# Patient Record
Sex: MALE | Race: WHITE
[De-identification: names, ages, dates, MRNs, and addresses within clinical notes are randomized per-mention and may not be internally consistent; named-entity substitution may affect disease eponyms.]

---

## 2017-02-18 ENCOUNTER — HOSPITAL ENCOUNTER (INPATIENT)
Dept: HOSPITAL 74 - YASAS | Age: 42
LOS: 3 days | Discharge: LEFT BEFORE DISCHARGE | DRG: 770 | End: 2017-02-21
Attending: INTERNAL MEDICINE | Admitting: INTERNAL MEDICINE
Payer: COMMERCIAL

## 2017-02-18 VITALS — BODY MASS INDEX: 27 KG/M2

## 2017-02-18 DIAGNOSIS — F17.210: ICD-10-CM

## 2017-02-18 DIAGNOSIS — E87.6: ICD-10-CM

## 2017-02-18 DIAGNOSIS — F10.230: ICD-10-CM

## 2017-02-18 DIAGNOSIS — F19.24: ICD-10-CM

## 2017-02-18 DIAGNOSIS — Z59.0: ICD-10-CM

## 2017-02-18 DIAGNOSIS — Z86.11: ICD-10-CM

## 2017-02-18 DIAGNOSIS — F11.23: Primary | ICD-10-CM

## 2017-02-18 PROCEDURE — HZ2ZZZZ DETOXIFICATION SERVICES FOR SUBSTANCE ABUSE TREATMENT: ICD-10-PCS | Performed by: INTERNAL MEDICINE

## 2017-02-18 RX ADMIN — Medication SCH MG: at 23:14

## 2017-02-18 SDOH — ECONOMIC STABILITY - HOUSING INSECURITY: HOMELESSNESS: Z59.0

## 2017-02-18 NOTE — HP
COWS





- Scale


Resting Pulse: 0= NC 80 or Below


Sweatin=Flushed/Facial Moisture


Restless Observation: 3= Extraneous Movement


Pupil Size: 1= Pupils >than Normal


Bone or Joint Aches: 1= Mild Discomfort


Runny Nose/ Eye Tearin= Runny Nose/Eyes


GI Upset > 30mins: 1= Stomach Cramp


Tremor Observation: 1= Tremor Felt, Not Seen


Yawning Observation: 1= 1-2x During Session


Anxiety or Irritability: 2=Irritable/Anxious


Goose Flesh Skin: 0=Smooth Skin


COWS Score: 14





CIWA Score





- CIWA Score


Nausea/Vomitin-Mild Nausea/No Vomiting


Muscle Tremors: 3


Anxiety: 3


Agitation: 3


Paroxysmal Sweats: 1-Minimal Palms Moist


Orientation: 1-Uncertain about Date


Tacttile Disturbances: 0-None


Auditory Disturbances: 2-Mild Harshness/Frighten


Visual Disturbances: 2-Mild Sensitivity


Headache: 0-None Present


CIWA-Ar Total Score: 16





Admission ROS [facility]





- HPI


Chief Complaint: 


WITHDRAWAL SYMPTOMS 





Allergies/Adverse Reactions: 


 Allergies











Allergy/AdvReac Type Severity Reaction Status Date / Time


 


Seafood Allergy Severe Swelling Uncoded 17 20:40











History of Present Illness: 


41 Y.O. MAN WITH AN EXTENSIVE HISTORY OF DRUG AND ALCOHOL DEPENDENCE IS SEEKING 

DETOX.  THE HAS COMPLETED DETOX AND REHAB PREVIOUSLY AT ANOTHER FACILITY.  HE 

STATES HE HAS HAD A PERIOD OF 4 YEARS OF SOBRIETY. 


Exam Limitations: No Limitations





- Ebola screening


Have you traveled outside of the country in the last 21 days: No (N)


Have you had contact with anyone from an Ebola affected area: No


Have you been sick,other than usual withdrawal symptoms: No


Do you have a fever: No





- Review of Systems


Constitutional: Loss of Appetite, Changes in sleep, Unintentional Wgt. Loss


EENT: reports: Blurred Vision, Double Vision, Tearing


Respiratory: reports: No Symptoms reported


Cardiac: reports: No Symptoms Reported


GI: reports: Poor Appetite


: reports: No Symptoms Reported


Musculoskeletal: reports: No Symptoms Reported


Integumentary: reports: No Symptoms Reported


Neuro: reports: No Symptoms reported, Tremors


Endocrine: reports: No Symptoms Reported


Hematology: reports: No Symptoms Reported


Psychiatric: reports: Anxious


Other Systems: Reviewed and Negative





Patient History





- Patient Medical History


Hx Anemia: No


Hx Asthma: No


Hx Chronic Obstructive Pulmonary Disease (COPD): No


Hx Cancer: No


Hx Cardiac Disorders: No


Hx Congestive Heart Failure: No


Hx Hypertension: No


Hx Hypercholesterolemia: No


Hx Pacemaker: No


HX Cerebrovascular Accident: No


Hx Seizures: No


Hx Dementia: No


Hx Diabetes: No


Hx Gastrointestinal Disorders: No


Hx Liver Disease: No


Hx Genitourinary Disorders: No


Hx Sexually Transmitted Disorders: No


Hx Renal Disease (ESRD): No


Hx Thyroid Disease: No


Hx Human Immunodeficiency Virus (HIV): No


Hx Hepatitis C: No


Hx Depression: No


Hx Suicide Attempt: No


Hx Bipolar Disorder: No


Hx Schizophrenia: No





- Patient Surgical History


Past Surgical History: No





- PPD History


Previous Implant?: No


Results: NEEDS CXR 


PPD to be Administered?: No





- Reproductive History


Patient is a Female of Child Bearing Age (11 -55 yrs old): No





- Smoking Cessation


Smoking history: Current every day smoker


Aproximately how many cigarettes per day: 10


Hx Chewing Tobacco Use: No


Initiated information on smoking cessation: Yes


'Breaking Loose' booklet given: 17





- Substance & Tx. History


Hx Alcohol Use: Yes


Hx Substance Use: Yes


Substance Use Type: Alcohol, Heroin


Hx Substance Use Treatment: Yes (DETOX AND REHAB )





- Substances Abused


  ** Alcohol


Route: Oral


Frequency: Daily


Amount used: beer- 2 six pack


Age of first use: 17


Date of Last Use: 17





  ** Heroin


Route: Injection


Frequency: Daily


Amount used: 15 bags


Age of first use: 24


Date of Last Use: 17





Family Disease History





- Family Disease History


Family History: Denies





Admission Physical Exam [facility]





- Vital Signs


Vital Signs: 


 Vital Signs - 24 hr











  17





  20:27


 


Temperature 97.3 F L


 


Pulse Rate 71


 


Respiratory 18





Rate 


 


Blood Pressure 111/74














- Physical


General Appearance: Yes: No Apparent Distress, Tremorous, Irritable, Anxious


HEENTM: Yes: Nasal Congestion


Respiratory: Yes: Chest Non-Tender, Lungs Clear, Normal Breath Sounds, No 

Respiratory Distress, No Accessory Muscle Use


Neck: Yes: No masses,lesions,Nodules, Trachea in good position


Breast: Yes: Breast Exam Deferred


Cardiology: Yes: Regular Rhythm, Regular Rate, S1, S2


Abdominal: Yes: Normal Bowel Sounds, Non Tender, Flat, Soft


Genitourinary: Yes: Within Normal Limits


Back: Yes: Normal Inspection


Extremities: Yes: Normal Capillary Refill, Normal Inspection, Normal Range of 

Motion, Non-Tender, Tremors


Neurological: Yes: Alert, Normal Mood/Affect, Normal Response


Integumentary: Yes: Normal Color, Dry, Warm, Track Marks


Lymphatic: Yes: Within Normal Limits





- Diagnostic


(1) Alcohol dependence with uncomplicated withdrawal


Current Visit: Yes   Status: Chronic





(2) Opioid dependence with withdrawal


Current Visit: Yes   Status: Chronic





(3) History of treatment for tuberculosis


Current Visit: Yes   Status: Chronic








Cleared for Admission [facility]





- Detox or Rehab


[facility] Level of Care: Medically Managed


Detox Regimen/Protocol: Methadone/Librium





[facility] Breath Alcohol Content


Breath Alcohol Content: 0





Urine Drug Screen





- Results


Drug Screen Negative: No


Urine Drug Screen Results: DANGELO-Cocaine, OPI-Opiates, MTD-Methadone, OXY-

Oxycodone

## 2017-02-19 LAB
ALBUMIN SERPL-MCNC: 3.4 G/DL (ref 3.4–5)
ALP SERPL-CCNC: 82 U/L (ref 45–117)
ALT SERPL-CCNC: 18 U/L (ref 12–78)
ANION GAP SERPL CALC-SCNC: 11 MMOL/L (ref 8–16)
AST SERPL-CCNC: 11 U/L (ref 15–37)
BILIRUB SERPL-MCNC: 0.4 MG/DL (ref 0.2–1)
CALCIUM SERPL-MCNC: 8.4 MG/DL (ref 8.5–10.1)
CO2 SERPL-SCNC: 27 MMOL/L (ref 21–32)
CREAT SERPL-MCNC: 1 MG/DL (ref 0.7–1.3)
DEPRECATED RDW RBC AUTO: 13.2 % (ref 11.9–15.9)
GLUCOSE SERPL-MCNC: 127 MG/DL (ref 74–106)
MCH RBC QN AUTO: 29.5 PG (ref 25.7–33.7)
MCHC RBC AUTO-ENTMCNC: 33.9 G/DL (ref 32–35.9)
MCV RBC: 87 FL (ref 80–96)
PLATELET # BLD AUTO: 244 K/MM3 (ref 134–434)
PMV BLD: 7.8 FL (ref 7.5–11.1)
PROT SERPL-MCNC: 6.1 G/DL (ref 6.4–8.2)
WBC # BLD AUTO: 5 K/MM3 (ref 4–10)

## 2017-02-19 RX ADMIN — Medication SCH TAB: at 10:25

## 2017-02-19 RX ADMIN — Medication SCH MG: at 22:41

## 2017-02-19 RX ADMIN — POTASSIUM CHLORIDE SCH MEQ: 1500 TABLET, EXTENDED RELEASE ORAL at 22:41

## 2017-02-19 RX ADMIN — NICOTINE SCH MG: 14 PATCH, EXTENDED RELEASE TRANSDERMAL at 10:26

## 2017-02-19 RX ADMIN — POTASSIUM CHLORIDE SCH: 1500 TABLET, EXTENDED RELEASE ORAL at 13:42

## 2017-02-20 LAB
APPEARANCE UR: CLEAR
BILIRUB UR STRIP.AUTO-MCNC: NEGATIVE MG/DL
COLOR UR: YELLOW
KETONES UR QL STRIP: NEGATIVE
LEUKOCYTE ESTERASE UR QL STRIP.AUTO: NEGATIVE
NITRITE UR QL STRIP: NEGATIVE
PH UR: 5 [PH] (ref 5–8)
PROT UR QL STRIP: NEGATIVE
PROT UR QL STRIP: NEGATIVE
RBC # UR STRIP: NEGATIVE /UL
SP GR UR: 1.02 (ref 1–1.03)
UROBILINOGEN UR STRIP-MCNC: NEGATIVE E.U./DL (ref 0.2–1)

## 2017-02-20 RX ADMIN — METHADONE HYDROCHLORIDE SCH MG: 5 TABLET ORAL at 10:11

## 2017-02-20 RX ADMIN — Medication SCH TAB: at 10:11

## 2017-02-20 RX ADMIN — POTASSIUM CHLORIDE SCH MEQ: 1500 TABLET, EXTENDED RELEASE ORAL at 10:10

## 2017-02-20 RX ADMIN — Medication SCH MG: at 22:14

## 2017-02-20 RX ADMIN — POTASSIUM CHLORIDE SCH MEQ: 1500 TABLET, EXTENDED RELEASE ORAL at 22:14

## 2017-02-20 RX ADMIN — NICOTINE SCH MG: 14 PATCH, EXTENDED RELEASE TRANSDERMAL at 10:11

## 2017-02-20 NOTE — CONSULT
[facility] Psychiatric Consult





- Data


Date of interview: 02/20/17


Admission source: [facility]


Identifying data: Mr Lewis is a 41 years old single  male, father of 

a 23 years old son, unemployed, homeless seeking detox treatment for alcohol 

and heroin


Substance Abuse History: - Smoking Cessation.  Smoking history: Current every 

day smoker.  Aproximately how many cigarettes per day: 10.  Hx Chewing Tobacco 

Use: No.  Initiated information on smoking cessation: Yes.  'Breaking Loose' 

booklet given: 02/18/17.  - Substance & Tx. History.  Hx Alcohol Use: Yes.  Hx 

Substance Use: Yes.  Substance Use Type: Alcohol, Heroin.  Hx Substance Use 

Treatment: Yes (DETOX AND REHAB ).  - Substances Abused.  ** Alcohol.  Route: 

Oral.  Frequency: Daily.  Amount used: beer- 2 six pack.  Age of first use: 17.

  Date of Last Use: 02/16/17.  ** Heroin.  Route: Injection.  Frequency: Daily.

  Amount used: 15 bags.  Age of first use: 24.  Date of Last Use: 02/17/17


Medical History: Unremarkable except prophylactic treatment for TB.  Smokes 10 

cigarettes daily


Psychiatric History: Denies previous psychiatric contact. However, reports 

feeling depressed





Mental Status Exam





- Mental Status Exam


Alert and Oriented to: Time, Place, Person


Cognitive Function: Fair


Patient Appearance: Well Groomed


Mood: Depressed


Affect: Blunted


Patient Behavior: Cooperative


Speech Pattern: Clear


Voice Loudness: Normal


Thought Process: Intact


Thought Disorder: Not Present


Hallucinations: Denies


Suicidal Ideation: Denies


Homicidal Ideation: Denies


Insight/Judgement: Poor


Sleep: Fair


Appetite: Good


Muscle strength/Tone: Normal


Gait/Station: Normal





Psychiatric Findings





- Problem List (Axis 1, 2,3)


(1) Substance induced mood disorder


Current Visit: Yes   Status: Acute





(2) Alcohol dependence with uncomplicated withdrawal


Current Visit: Yes   Status: Chronic





(3) Opioid dependence with withdrawal


Current Visit: Yes   Status: Chronic





(4) Nicotine dependence


Current Visit: Yes   Status: Acute   








- Initial Treatment Plan


Initial Treatment Plan: Continue inpatient detoxification

## 2017-02-20 NOTE — EKG
Test Reason : 

Blood Pressure : ***/*** mmHG

Vent. Rate : 071 BPM     Atrial Rate : 071 BPM

   P-R Int : 158 ms          QRS Dur : 112 ms

    QT Int : 388 ms       P-R-T Axes : 062 019 035 degrees

   QTc Int : 421 ms

 

NORMAL SINUS RHYTHM

NORMAL ECG

NO PREVIOUS ECGS AVAILABLE

Confirmed by KATE PORTER MD ([id number]) on 2/20/2017 12:47:11 AM

 

Referred By:             Confirmed By:KATE PORTER MD

## 2017-02-20 NOTE — PN
S CIWA





- CIWA Score


Nausea/Vomitin


Muscle Tremors: 3


Anxiety: 3


Agitation: 2


Paroxysmal Sweats: 3


Orientation: 0-Oriented


Tacttile Disturbances: 2-Mild Itch/Numbness/Burn


Auditory Disturbances: 1-Very Mild


Visual Disturbances: 2-Mild Sensitivity


Headache: 0-None Present


CIWA-Ar Total Score: 18





[facility] COWS





- Scale


Resting Pulse: 0= [place] 80 or Below


Sweatin= Chills/Flushing


Restless Observation: 1= Difficult to Sit Still


Pupil Size: 0= Normal to Room Light


Bone or Joint Aches: 2= Severe Diffuse Aches


Runny Nose/ Eye Tearin= Nasal Congestion


GI Upset > 30mins: 2= Nausea/Diarrhea


Tremor Observation of Outstretched Hands: 2= Slight Tremor Visible


Yawning Observation: 1= 1-2x During Session


Anxiety or Irritability: 1=Feels Anxious/Irritable


Goose Flesh Skin: 3=Piloerection


COWS Score: 14





B[facility] Progress Note (SOAP)


Subjective: 


Tremors, Lower Back Ache, Nausea, Sweating, Anxiety.


Objective: 


PT. A & O X 3, OBSERVED AMBULATING ON UNIT.





17 16:24


 Vital Signs











Temperature  98.2 F   17 12:59


 


Pulse Rate  80   17 12:59


 


Respiratory Rate  18   17 12:59


 


Blood Pressure  125/81   17 12:59


 


O2 Sat by Pulse Oximetry (%)      








 Laboratory Last Values











WBC  5.0 K/mm3 (4.0-10.0)   17  07:45    


 


RBC  4.29 M/mm3 (4.00-5.60)   17  07:45    


 


Hgb  12.6 GM/dL (11.7-16.9)   17  07:45    


 


Hct  37.3 % (35.4-49)   17  07:45    


 


MCV  87.0 fl (80-96)   17  07:45    


 


MCHC  33.9 g/dl (32.0-35.9)   17  07:45    


 


RDW  13.2 % (11.9-15.9)   17  07:45    


 


Plt Count  244 K/MM3 (134-434)   17  07:45    


 


MPV  7.8 fl (7.5-11.1)   17  07:45    


 


Sodium  142 mmol/L (136-145)   17  07:45    


 


Potassium  3.4 mmol/L (3.5-5.1)  L  17  07:45    


 


Chloride  104 mmol/L ()   17  07:45    


 


Carbon Dioxide  27 mmol/L (21-32)   17  07:45    


 


Anion Gap  11  (8-16)   17  07:45    


 


BUN  12 mg/dL (7-18)   17  07:45    


 


Creatinine  1.0 mg/dL (0.7-1.3)   17  07:45    


 


Creat Clearance w eGFR  > 60  (>60)   17  07:45    


 


Random Glucose  127 mg/dL ()  H  17  07:45    


 


Calcium  8.4 mg/dL (8.5-10.1)  L  17  07:45    


 


Total Bilirubin  0.4 mg/dL (0.2-1.0)   17  07:45    


 


AST  11 U/L (15-37)  L  17  07:45    


 


ALT  18 U/L (12-78)   17  07:45    


 


Alkaline Phosphatase  82 U/L ()   17  07:45    


 


Total Protein  6.1 g/dl (6.4-8.2)  L  17  07:45    


 


Albumin  3.4 g/dl (3.4-5.0)   17  07:45    


 


Urine Color  Yellow   17  07:00    


 


Urine Appearance  Clear   17  07:00    


 


Urine pH  5.0  (5.0-8.0)   17  07:00    


 


Ur Specific Gravity  1.019  (1.001-1.035)   17  07:00    


 


Urine Protein  Negative  (NEGATIVE)   17  07:00    


 


Urine Glucose (UA)  Negative  (NEGATIVE)   17  07:00    


 


Urine Ketones  Negative  (NEGATIVE)   17  07:00    


 


Urine Blood  Negative  (NEGATIVE)   17  07:00    


 


Urine Nitrite  Negative  (NEGATIVE)   17  07:00    


 


Urine Bilirubin  Negative  (NEGATIVE)   17  07:00    


 


Urine Urobilinogen  Negative E.U./dl (0.2-1.0)   17  07:00    


 


Ur Leukocyte Esterase  Negative  (NEGATIVE)   17  07:00    


 


RPR Titer  Nonreactive  (NONREACTIVE)   17  07:45    








LABS NOTED.





17 16:26





Assessment: 


17 16:25


WITHDRAWAL SYMPTOMS.


HYPOKALEMIA.





Plan: 


CONTINUE DETOX.

## 2017-02-21 VITALS — SYSTOLIC BLOOD PRESSURE: 111 MMHG | TEMPERATURE: 97.7 F | DIASTOLIC BLOOD PRESSURE: 78 MMHG | HEART RATE: 77 BPM

## 2017-02-21 RX ADMIN — METHADONE HYDROCHLORIDE SCH MG: 5 TABLET ORAL at 10:09

## 2017-02-21 RX ADMIN — Medication SCH TAB: at 10:09

## 2017-02-21 RX ADMIN — POTASSIUM CHLORIDE SCH MEQ: 1500 TABLET, EXTENDED RELEASE ORAL at 10:09

## 2017-02-21 RX ADMIN — NICOTINE SCH MG: 14 PATCH, EXTENDED RELEASE TRANSDERMAL at 10:09

## 2017-02-21 NOTE — DS
[facility] Detox Discharge Summary


Admission Date: 


02/18/17





Discharge Date: 02/21/17





- History


Present History: Alcohol Dependence, Opioid Dependence


Additional Comments: 


PT DECLINED TO CONTINUE WITH DETOX  STATING  PERSONAL REASONS. 





Pertinent Past History: 


DEPRESSION HX





- Physical Exam Results


Vital Signs: 


 Vital Signs











Temperature  97.7 F   02/21/17 11:06


 


Pulse Rate  77   02/21/17 11:06


 


Respiratory Rate  19   02/21/17 11:06


 


Blood Pressure  111/78   02/21/17 11:06


 


O2 Sat by Pulse Oximetry (%)      











Pertinent Admission Physical Exam Findings: 


WITHDRAWAL SX


 Laboratory Last Values











WBC  5.0 K/mm3 (4.0-10.0)   02/19/17  07:45    


 


RBC  4.29 M/mm3 (4.00-5.60)   02/19/17  07:45    


 


Hgb  12.6 GM/dL (11.7-16.9)   02/19/17  07:45    


 


Hct  37.3 % (35.4-49)   02/19/17  07:45    


 


MCV  87.0 fl (80-96)   02/19/17  07:45    


 


MCHC  33.9 g/dl (32.0-35.9)   02/19/17  07:45    


 


RDW  13.2 % (11.9-15.9)   02/19/17  07:45    


 


Plt Count  244 K/MM3 (134-434)   02/19/17  07:45    


 


MPV  7.8 fl (7.5-11.1)   02/19/17  07:45    


 


Sodium  142 mmol/L (136-145)   02/19/17  07:45    


 


Potassium  3.4 mmol/L (3.5-5.1)  L  02/19/17  07:45    


 


Chloride  104 mmol/L ()   02/19/17  07:45    


 


Carbon Dioxide  27 mmol/L (21-32)   02/19/17  07:45    


 


Anion Gap  11  (8-16)   02/19/17  07:45    


 


BUN  12 mg/dL (7-18)   02/19/17  07:45    


 


Creatinine  1.0 mg/dL (0.7-1.3)   02/19/17  07:45    


 


Creat Clearance w eGFR  > 60  (>60)   02/19/17  07:45    


 


Random Glucose  127 mg/dL ()  H  02/19/17  07:45    


 


Calcium  8.4 mg/dL (8.5-10.1)  L  02/19/17  07:45    


 


Total Bilirubin  0.4 mg/dL (0.2-1.0)   02/19/17  07:45    


 


AST  11 U/L (15-37)  L  02/19/17  07:45    


 


ALT  18 U/L (12-78)   02/19/17  07:45    


 


Alkaline Phosphatase  82 U/L ()   02/19/17  07:45    


 


Total Protein  6.1 g/dl (6.4-8.2)  L  02/19/17  07:45    


 


Albumin  3.4 g/dl (3.4-5.0)   02/19/17  07:45    


 


Urine Color  Yellow   02/20/17  07:00    


 


Urine Appearance  Clear   02/20/17  07:00    


 


Urine pH  5.0  (5.0-8.0)   02/20/17  07:00    


 


Ur Specific Gravity  1.019  (1.001-1.035)   02/20/17  07:00    


 


Urine Protein  Negative  (NEGATIVE)   02/20/17  07:00    


 


Urine Glucose (UA)  Negative  (NEGATIVE)   02/20/17  07:00    


 


Urine Ketones  Negative  (NEGATIVE)   02/20/17  07:00    


 


Urine Blood  Negative  (NEGATIVE)   02/20/17  07:00    


 


Urine Nitrite  Negative  (NEGATIVE)   02/20/17  07:00    


 


Urine Bilirubin  Negative  (NEGATIVE)   02/20/17  07:00    


 


Urine Urobilinogen  Negative E.U./dl (0.2-1.0)   02/20/17  07:00    


 


Ur Leukocyte Esterase  Negative  (NEGATIVE)   02/20/17  07:00    


 


RPR Titer  Nonreactive  (NONREACTIVE)   02/19/17  07:45    














- Treatment


Hospital Course: Discharged Condition Good





- Medication


Discharge Medications: 


Ambulatory Orders





NK [No Known Home Medication]  02/18/17 











- Diagnosis


(1) Nicotine dependence


Status: Acute   Qualifiers: 


     Nicotine product type: cigarettes     Substance use status: in withdrawal 

       Qualified Code(s): F17.213 - Nicotine dependence, cigarettes, with 

withdrawal  





(2) Substance induced mood disorder


Status: Acute





(3) Alcohol dependence with uncomplicated withdrawal


Status: Acute





(4) History of treatment for tuberculosis


Status: Chronic





(5) Opioid dependence with withdrawal


Status: Acute








- AMA


Did Patient Leave Against Medical Advice: Yes (AMA)

## 2017-02-21 NOTE — PN
[facility] Progress Note (SOAP)


Subjective: 


ANXIETY,SWEAT/CHILLS,DECREASED APPETITE


Objective: 





02/21/17 10:58


 Vital Signs











Temperature  96.7 F L  02/21/17 06:41


 


Pulse Rate  68   02/21/17 06:41


 


Respiratory Rate  18   02/21/17 06:41


 


Blood Pressure  121/86   02/21/17 06:41


 


O2 Sat by Pulse Oximetry (%)      











Assessment: 





02/21/17 10:58


WITHDRAWAL SX


Plan: 


CONTINUE DETOX

## 2017-04-08 ENCOUNTER — HOSPITAL ENCOUNTER (INPATIENT)
Dept: HOSPITAL 74 - YASAS | Age: 42
LOS: 1 days | Discharge: LEFT BEFORE DISCHARGE | DRG: 770 | End: 2017-04-09
Attending: INTERNAL MEDICINE | Admitting: INTERNAL MEDICINE
Payer: COMMERCIAL

## 2017-04-08 VITALS — BODY MASS INDEX: 30.8 KG/M2

## 2017-04-08 DIAGNOSIS — F11.23: Primary | ICD-10-CM

## 2017-04-08 DIAGNOSIS — F17.213: ICD-10-CM

## 2017-04-08 DIAGNOSIS — Z59.0: ICD-10-CM

## 2017-04-08 RX ADMIN — NICOTINE SCH MG: 21 PATCH TRANSDERMAL at 19:31

## 2017-04-08 SDOH — ECONOMIC STABILITY - HOUSING INSECURITY: HOMELESSNESS: Z59.0

## 2017-04-08 NOTE — HP
COWS





- Scale


Resting Pulse: 0= [place] 80 or Below


Sweatin=Flushed/Facial Moisture


Restless Observation: 3= Extraneous Movement


Pupil Size: 2= Moderately Dilated


Bone or Joint Aches: 1= Mild Discomfort


Runny Nose/ Eye Tearin= Runny Nose/Eyes


GI Upset > 30mins: 2= Nausea/Diarrhea


Tremor Observation: 2= Slight Tremor Visible


Yawning Observation: 1= 1-2x During Session


Anxiety or Irritability: 2=Irritable/Anxious


Goose Flesh Skin: 0=Smooth Skin


COWS Score: 17





Admission ROS S





- HPI


Chief Complaint: 


Withdrawal sx.


Allergies/Adverse Reactions: 


 Allergies











Allergy/AdvReac Type Severity Reaction Status Date / Time


 


fish derived Allergy Severe Swelling Verified 17 15:52


 


shellfish derived Allergy Severe Swelling Verified 17 15:52


 


No Known Drug Allergies Allergy   Verified 17 15:52


 


Seafood Allergy Severe Swelling Uncoded 17 15:52











History of Present Illness: 


42 y/o man with a long hx. of drug dependence is admitted for detox. Pt. has 

been in previous detox,he reports 2 yrs. drug free.


Exam Limitations: No Limitations





- Ebola screening


Have you traveled outside of the country in the last 21 days: No


Have you had contact with anyone from an Ebola affected area: No


Have you been sick,other than usual withdrawal symptoms: No


Do you have a fever: No





- Review of Systems


Constitutional: Diaphoresis


EENT: reports: Nose Congestion


Respiratory: reports: No Symptoms reported


Cardiac: reports: No Symptoms Reported


GI: reports: Nausea, Abdominal cramping


: reports: No Symptoms Reported


Musculoskeletal: reports: Back Pain


Integumentary: reports: Sweating


Neuro: reports: Tremors


Endocrine: reports: No Symptoms Reported


Hematology: reports: No Symptoms Reported


Psychiatric: reports: No Sypmtoms Reported


Other Systems: Reviewed and Negative





Patient History





- Patient Medical History


Hx Anemia: No


Hx Asthma: Yes (Albuterol)


Hx Chronic Obstructive Pulmonary Disease (COPD): No


Hx Cancer: No


Hx Cardiac Disorders: No


Hx Congestive Heart Failure: No


Hx Hypertension: No


Hx Hypercholesterolemia: No


Hx Pacemaker: No


HX Cerebrovascular Accident: No


Hx Seizures: No


Hx Dementia: No


Hx Diabetes: No


Hx Gastrointestinal Disorders: No


Hx Liver Disease: No


Hx Genitourinary Disorders: No


Hx Sexually Transmitted Disorders: No


Hx Renal Disease (ESRD): No


Hx Thyroid Disease: No


Hx Human Immunodeficiency Virus (HIV): No


Hx Hepatitis C: No


Hx Depression: Yes


Hx Suicide Attempt: No


Hx Bipolar Disorder: No


Hx Schizophrenia: No





- Patient Surgical History


Past Surgical History: No


Hx Neurologic Surgery: No


Hx Cataract Extraction: No


Hx Cardiac Surgery: No


Hx Lung Surgery: No


Hx Breast Surgery: No


Hx Breast Biopsy: No


Hx Abdominal Surgery: No


Hx Appendectomy: No


Hx Cholecystectomy: No


Hx Genitourinary Surgery: No


Hx  Section: No


Hx Orthopedic Surgery: No


Anesthesia Reaction: No





- PPD History


Previous Implant?: Yes


Documented Results: Positive w/proof


Results: NEEDS CXR 


PPD to be Administered?: No





- Smoking Cessation


Smoking history: Current every day smoker


Have you smoked in the past 12 months: Yes


Aproximately how many cigarettes per day: 10


Hx Chewing Tobacco Use: No


Initiated information on smoking cessation: Yes


'Breaking Loose' booklet given: 17





- Substance & Tx. History


Hx Alcohol Use: No


Hx Substance Use: Yes


Substance Use Type: Heroin


Hx Substance Use Treatment: Yes (Detox & Rehab)





- Substances Abused


  ** Heroin


Route: Injection


Frequency: Daily


Amount used: 10 bags


Age of first use: 29


Date of Last Use: 17





Family Disease History





- Family Disease History


Family Disease History: Heart Disease: Mother (HTN)





Admission Physical Exam [facility]





- Vital Signs


Vital Signs: 


 Vital Signs - 24 hr











  17





  14:46


 


Temperature 96.8 F L


 


Pulse Rate 66


 


Respiratory 18





Rate 


 


Blood Pressure 125/80














- Physical


General Appearance: Yes: Irritable, Sweating, Anxious


HEENTM: Yes: Nasal Congestion, Rhinorrhea


Respiratory: Yes: Chest Non-Tender, Lungs Clear, Normal Breath Sounds


Neck: Yes: Supple


Breast: Yes: Breast Exam Deferred


Cardiology: Yes: Regular Rhythm, Regular Rate, S1, S2


Abdominal: Yes: Normal Bowel Sounds, Non Tender, Soft


Genitourinary: Yes: Within Normal Limits


Back: Yes: Within Normal Limits


Musculoskeletal: Yes: Within Normal Limits


Extremities: Yes: Tremors


Neurological: Yes: Fully Oriented, Alert


Integumentary: Yes: Track Marks


Lymphatic: Yes: Within Normal Limits





- Diagnostic


(1) Nicotine dependence


Current Visit: Yes   Status: Acute   Qualifiers: 


     Nicotine product type: cigarettes     Substance use status: in withdrawal 

       Qualified Code(s): F17.213 - Nicotine dependence, cigarettes, with 

withdrawal  





(2) Opioid dependence with withdrawal


Current Visit: Yes   Status: Acute








Cleared for Admission [facility]





- Detox or Rehab


[facility] Level of Care: Medically Managed


Detox Regimen/Protocol: Methadone





[facility] Breath Alcohol Content


Breath Alcohol Content: 0





Urine Drug Screen





- Results


Drug Screen Negative: No


Urine Drug Screen Results: THC-Marijuana, OPI-Opiates, MTD-Methadone

## 2017-04-09 VITALS — HEART RATE: 86 BPM | SYSTOLIC BLOOD PRESSURE: 135 MMHG | DIASTOLIC BLOOD PRESSURE: 89 MMHG | TEMPERATURE: 97 F

## 2017-04-09 LAB
ALBUMIN SERPL-MCNC: 3.6 G/DL (ref 3.4–5)
ALP SERPL-CCNC: 90 U/L (ref 45–117)
ALT SERPL-CCNC: 33 U/L (ref 12–78)
ANION GAP SERPL CALC-SCNC: 11 MMOL/L (ref 8–16)
AST SERPL-CCNC: 15 U/L (ref 15–37)
BILIRUB SERPL-MCNC: 0.9 MG/DL (ref 0.2–1)
CALCIUM SERPL-MCNC: 8.9 MG/DL (ref 8.5–10.1)
CO2 SERPL-SCNC: 29 MMOL/L (ref 21–32)
COCKROFT - GAULT: 131.02
CREAT SERPL-MCNC: 0.9 MG/DL (ref 0.7–1.3)
DEPRECATED RDW RBC AUTO: 13 % (ref 11.9–15.9)
GLUCOSE SERPL-MCNC: 82 MG/DL (ref 74–106)
MCH RBC QN AUTO: 28.8 PG (ref 25.7–33.7)
MCHC RBC AUTO-ENTMCNC: 33.6 G/DL (ref 32–35.9)
MCV RBC: 85.6 FL (ref 80–96)
PLATELET # BLD AUTO: 242 K/MM3 (ref 134–434)
PMV BLD: 7.6 FL (ref 7.5–11.1)
PROT SERPL-MCNC: 7 G/DL (ref 6.4–8.2)
WBC # BLD AUTO: 5.7 K/MM3 (ref 4–10)

## 2017-04-09 RX ADMIN — NICOTINE SCH MG: 21 PATCH TRANSDERMAL at 10:28

## 2017-04-09 NOTE — EKG
Test Reason : 

Blood Pressure : ***/*** mmHG

Vent. Rate : 062 BPM     Atrial Rate : 062 BPM

   P-R Int : 160 ms          QRS Dur : 094 ms

    QT Int : 426 ms       P-R-T Axes : 033 029 047 degrees

   QTc Int : 432 ms

 

NORMAL SINUS RHYTHM

NORMAL ECG

WHEN COMPARED WITH ECG OF 18-FEB-2017 22:02,

NO SIGNIFICANT CHANGE WAS FOUND

Confirmed by BARI RASHID MD ([id number]) on 4/9/2017 5:17:17 PM

 

Referred By:             Confirmed By:BARI RASHID MD

## 2017-04-09 NOTE — PN
[facility] COWS





- Scale


Resting Pulse: 0= [place] 80 or Below


Sweatin=Flushed/Facial Moisture


Restless Observation: 1= Difficult to Sit Still


Pupil Size: 0= Normal to Room Light


Bone or Joint Aches: 1= Mild Discomfort


Runny Nose/ Eye Tearin= Runny Nose/Eyes


GI Upset > 30mins: 2= Nausea/Diarrhea


Tremor Observation of Outstretched Hands: 2= Slight Tremor Visible


Yawning Observation: 1= 1-2x During Session


Anxiety or Irritability: 2=Irritable/Anxious


Goose Flesh Skin: 0=Smooth Skin


COWS Score: 13





[facility] Progress Note (SOAP)


Subjective: 


Anxiety,sweating,interrupted sleep,restless,tremors.


Objective: 





17 12:20


 Vital Signs - 8 hr











  17





  06:45 09:31


 


Temperature 97.8 F 97.9 F


 


Pulse Rate 71 80


 


Respiratory 18 20





Rate  


 


Blood Pressure 132/91 129/90








 Laboratory Tests











  17





  07:30 07:30 07:30


 


WBC  5.7  


 


RBC  4.71  


 


Hgb  13.6  


 


Hct  40.4  


 


MCV  85.6  


 


MCHC  33.6  


 


RDW  13.0  


 


Plt Count  242  


 


MPV  7.6  


 


Sodium   139 


 


Potassium   3.8 


 


Chloride   99 


 


Carbon Dioxide   29 


 


Anion Gap   11 


 


BUN   6 L D 


 


Creatinine   0.9 


 


Creat Clearance w eGFR   > 60 


 


Random Glucose   82  D 


 


Calcium   8.9 


 


Total Bilirubin   0.9  D 


 


AST   15  D 


 


ALT   33  D 


 


Alkaline Phosphatase   90 


 


Total Protein   7.0 


 


Albumin   3.6 


 


RPR Titer    Nonreactive








labs noted


Assessment: 





17 12:20


Withdrawal sx.


Plan: 


Continue detox

## 2017-04-13 NOTE — DS
[facility] Detox Discharge Summary


Admission Date: 


04/08/17





Discharge Date: 04/09/17





- History


Present History: Alcohol Dependence, Opioid Dependence


Pertinent Past History: 


PPD+





- Physical Exam Results


Vital Signs: 


 Vital Signs











Temperature  97.0 F L  04/09/17 13:54


 


Pulse Rate  86   04/09/17 13:54


 


Respiratory Rate  18   04/09/17 13:54


 


Blood Pressure  135/89   04/09/17 13:54


 


O2 Sat by Pulse Oximetry (%)      











Pertinent Admission Physical Exam Findings: 


Withdrawal sx.


 Laboratory Last Values











WBC  5.7 K/mm3 (4.0-10.0)   04/09/17  07:30    


 


RBC  4.71 M/mm3 (4.00-5.60)   04/09/17  07:30    


 


Hgb  13.6 GM/dL (11.7-16.9)   04/09/17  07:30    


 


Hct  40.4 % (35.4-49)   04/09/17  07:30    


 


MCV  85.6 fl (80-96)   04/09/17  07:30    


 


MCHC  33.6 g/dl (32.0-35.9)   04/09/17  07:30    


 


RDW  13.0 % (11.9-15.9)   04/09/17  07:30    


 


Plt Count  242 K/MM3 (134-434)   04/09/17  07:30    


 


MPV  7.6 fl (7.5-11.1)   04/09/17  07:30    


 


Sodium  139 mmol/L (136-145)   04/09/17  07:30    


 


Potassium  3.8 mmol/L (3.5-5.1)   04/09/17  07:30    


 


Chloride  99 mmol/L ()   04/09/17  07:30    


 


Carbon Dioxide  29 mmol/L (21-32)   04/09/17  07:30    


 


Anion Gap  11  (8-16)   04/09/17  07:30    


 


BUN  6 mg/dL (7-18)  L D 04/09/17  07:30    


 


Creatinine  0.9 mg/dL (0.7-1.3)   04/09/17  07:30    


 


Creat Clearance w eGFR  > 60  (>60)   04/09/17  07:30    


 


Random Glucose  82 mg/dL ()  D 04/09/17  07:30    


 


Calcium  8.9 mg/dL (8.5-10.1)   04/09/17  07:30    


 


Total Bilirubin  0.9 mg/dL (0.2-1.0)  D 04/09/17  07:30    


 


AST  15 U/L (15-37)  D 04/09/17  07:30    


 


ALT  33 U/L (12-78)  D 04/09/17  07:30    


 


Alkaline Phosphatase  90 U/L ()   04/09/17  07:30    


 


Total Protein  7.0 g/dl (6.4-8.2)   04/09/17  07:30    


 


Albumin  3.6 g/dl (3.4-5.0)   04/09/17  07:30    


 


RPR Titer  Nonreactive  (NONREACTIVE)   04/09/17  07:30    








labs noted





- Treatment


Patient has Accepted a Rehab Referral to: [facility]





- Medication


Discharge Medications: 


Ambulatory Orders





NK [No Known Home Medication]  02/18/17 











- Diagnosis


(1) Nicotine dependence


Status: Acute   Qualifiers: 


     Nicotine product type: cigarettes     Substance use status: in withdrawal 

       Qualified Code(s): F17.213 - Nicotine dependence, cigarettes, with 

withdrawal  





(2) Opioid dependence with withdrawal


Status: Acute








- AMA


Did Patient Leave Against Medical Advice: Yes

## 2017-10-21 ENCOUNTER — HOSPITAL ENCOUNTER (INPATIENT)
Dept: HOSPITAL 74 - YASAS | Age: 42
LOS: 1 days | Discharge: LEFT BEFORE DISCHARGE | DRG: 770 | End: 2017-10-22
Attending: INTERNAL MEDICINE | Admitting: INTERNAL MEDICINE
Payer: COMMERCIAL

## 2017-10-21 VITALS — BODY MASS INDEX: 31.3 KG/M2

## 2017-10-21 DIAGNOSIS — F12.20: ICD-10-CM

## 2017-10-21 DIAGNOSIS — R76.11: ICD-10-CM

## 2017-10-21 DIAGNOSIS — F11.23: Primary | ICD-10-CM

## 2017-10-21 DIAGNOSIS — Z59.0: ICD-10-CM

## 2017-10-21 DIAGNOSIS — Z91.013: ICD-10-CM

## 2017-10-21 DIAGNOSIS — F17.210: ICD-10-CM

## 2017-10-21 DIAGNOSIS — F13.20: ICD-10-CM

## 2017-10-21 LAB
APPEARANCE UR: (no result)
BILIRUB UR STRIP.AUTO-MCNC: NEGATIVE MG/DL
COLOR UR: (no result)
KETONES UR QL STRIP: NEGATIVE
NITRITE UR QL STRIP: NEGATIVE
PH UR: 6 [PH] (ref 5–8)
PROT UR QL STRIP: NEGATIVE
PROT UR QL STRIP: NEGATIVE
RBC # UR STRIP: NEGATIVE /UL
UROBILINOGEN UR STRIP-MCNC: 2 MG/DL (ref 0.2–1)

## 2017-10-21 PROCEDURE — HZ2ZZZZ DETOXIFICATION SERVICES FOR SUBSTANCE ABUSE TREATMENT: ICD-10-PCS | Performed by: INTERNAL MEDICINE

## 2017-10-21 RX ADMIN — NICOTINE SCH: 21 PATCH TRANSDERMAL at 18:49

## 2017-10-21 SDOH — ECONOMIC STABILITY - HOUSING INSECURITY: HOMELESSNESS: Z59.0

## 2017-10-21 NOTE — HP
COWS





- Scale


Resting Pulse: 0= [place] 80 or Below


Sweatin= Chills/Flushing


Restless Observation: 0= Sits Still


Pupil Size: 0= Normal to Room Light


Bone or Joint Aches: 1= Mild Discomfort


Runny Nose/ Eye Tearin= Nasal Congestion


GI Upset > 30mins: 1= Stomach Cramp


Tremor Observation: 2= Slight Tremor Visible


Yawning Observation: 1= 1-2x During Session


Anxiety or Irritability: 1=Feels Anxious/Irritable


Goose Flesh Skin: 0=Smooth Skin


COWS Score: 8





Admission ROS [facility]





- HPI


Chief Complaint: 


I'm here for me this time, I have hope I can get clean


Allergies/Adverse Reactions: 


 Allergies











Allergy/AdvReac Type Severity Reaction Status Date / Time


 


fish derived Allergy Severe Swelling Verified 17 15:52


 


shellfish derived Allergy Severe Swelling Verified 17 15:52


 


No Known Drug Allergies Allergy   Verified 17 15:52


 


Seafood Allergy Severe Swelling Uncoded 17 15:52











History of Present Illness: 


41 yo gentleman here for detox from opiates - one of several admissions.  No 

seizures, states never been on methadone program or suboxone.


Exam Limitations: Clinical Condition





- Ebola screening


Have you traveled outside of the country in the last 21 days: No


Have you had contact with anyone from an Ebola affected area: No


Have you been sick,other than usual withdrawal symptoms: No


Do you have a fever: No





- Review of Systems


Constitutional: Loss of Appetite, Malaise, Changes in sleep, Weakness


EENT: reports: Nose Congestion


Respiratory: reports: No Symptoms reported


Cardiac: reports: No Symptoms Reported


GI: reports: Nausea, Poor Appetite, Indigestion


: reports: Dysuria


Musculoskeletal: reports: Back Pain, Muscle Pain


Integumentary: reports: No Symptoms Reported


Neuro: reports: Headache


Endocrine: reports: No Symptoms Reported


Hematology: reports: No Symptoms Reported


Psychiatric: reports: Judgement Intact, Mood/Affect Appropiate, Anxious


Other Systems: Reviewed and Negative





Patient History





- Patient Medical History


Hx Anemia: No


Hx Asthma: No


Hx Chronic Obstructive Pulmonary Disease (COPD): No


Hx Cancer: No


Hx Cardiac Disorders: No


Hx Congestive Heart Failure: No


Hx Hypertension: No


Hx Hypercholesterolemia: No


Hx Pacemaker: No


HX Cerebrovascular Accident: No


Hx Seizures: No


Hx Dementia: No


Hx Diabetes: No


Hx Gastrointestinal Disorders: No


Hx Liver Disease: No


Hx Genitourinary Disorders: No


Hx Sexually Transmitted Disorders: No


Hx Renal Disease (ESRD): No


Hx Thyroid Disease: No


Hx Human Immunodeficiency Virus (HIV): No


Hx Hepatitis C: No


Hx Depression: Yes (never hospitalized)


Hx Suicide Attempt: No


Hx Bipolar Disorder: No


Hx Schizophrenia: No





- Patient Surgical History


Past Surgical History: No


Hx Neurologic Surgery: No


Hx Cataract Extraction: No


Hx Cardiac Surgery: No


Hx Lung Surgery: No


Hx Breast Surgery: No


Hx Breast Biopsy: No


Hx Abdominal Surgery: No


Hx Appendectomy: No


Hx Cholecystectomy: No


Hx Genitourinary Surgery: No


Hx  Section: No


Hx Orthopedic Surgery: No


Anesthesia Reaction: No





- PPD History


Previous Implant?: Yes


Documented Results: Positive w/o proof (took medication x 9 months)


Results: NEEDS CXR 


PPD to be Administered?: No





- Reproductive History


Patient is a Female of Child Bearing Age (11 -55 yrs old): No (male)





- Smoking Cessation


Smoking history: Current every day smoker


Have you smoked in the past 12 months: Yes


Aproximately how many cigarettes per day: 10


Hx Chewing Tobacco Use: No


Initiated information on smoking cessation: Yes


'Breaking Loose' booklet given: 10/21/17 (give on floor)





- Substance & Tx. History


Hx Alcohol Use: No


Hx Substance Use: Yes


Substance Use Type: Heroin, Marijuana, Opiates, Tranquilizers


Hx Substance Use Treatment: Yes (detox, rehab)





- Substances Abused


  ** percocet


Route: Oral


Frequency: 1-2 times per week


Amount used: three 10mg pills


Age of first use: 35


Date of Last Use: 10/20/17





  ** Heroin


Route: Injection


Frequency: Daily


Amount used: 15 bags


Age of first use: 18


Date of Last Use: 10/21/17





  ** Marijuana/Hashish


Route: Smoking


Frequency: 1-2 times per week


Amount used: 1 blunt


Age of first use: 15


Date of Last Use: 10/18/17





  ** Alprazolam (Xanax)


Route: Oral


Frequency: Daily


Amount used: two 4mg sticks


Age of first use: 36


Date of Last Use: 10/16/17





Family Disease History





- Family Disease History


Family Disease History: Other: Father (living), Mother (living), Brother (one - 

living - healthy), Son (healthy)





Admission Physical Exam [facility]





- Vital Signs


Vital Signs: 


 Vital Signs - 24 hr











  10/21/17





  14:15


 


Temperature 97.3 F L


 


Pulse Rate 76


 


Respiratory 20





Rate 


 


Blood Pressure 126/84














- Physical


General Appearance: Yes: Nourished, Appropriately Dressed, Moderate Distress, 

Anxious


HEENTM: Yes: Hearing grossly Normal, Normocephalic, Normal Voice, Pharynx Normal


Respiratory: Yes: Normal Breath Sounds, No Respiratory Distress


Neck: Yes: No masses,lesions,Nodules, Supple


Breast: Yes: Breast Exam Deferred


Cardiology: Yes: Regular Rhythm, Regular Rate


Abdominal: Yes: Soft


Genitourinary: Yes: Dysuria


Back: Yes: Normal Inspection


Musculoskeletal: Yes: full range of Motion, Gait Steady


Extremities: Yes: Normal Inspection, Non-Tender


Neurological: Yes: Fully Oriented, Alert, Normal Mood/Affect, Normal Response


Integumentary: Yes: Normal Color, Warm, Track Marks (both hands - mild swelling 

but no palpable abscess or redness)


Lymphatic: Yes: Within Normal Limits





- Diagnostic


(1) Opioid dependence with withdrawal


Current Visit: Yes   Status: Chronic





(2) PPD positive, treated


Current Visit: Yes   Status: Chronic





(3) Nicotine dependence


Current Visit: Yes   Status: Chronic   Qualifiers: 


     Nicotine product type: cigarettes     Substance use status: in withdrawal 

       Qualified Code(s): F17.213 - Nicotine dependence, cigarettes, with 

withdrawal; F17.213 - Nicotine dependence, cigarettes, with withdrawal  








Cleared for Admission [facility]





- Detox or Rehab


[facility] Level of Care: Medically Managed


Detox Regimen/Protocol: Methadone





[facility] Breath Alcohol Content


Breath Alcohol Content: 0





Urine Drug Screen





- Results


Drug Screen Negative: No


Urine Drug Screen Results: THC-Marijuana, DANGELO-Cocaine, OPI-Opiates, BZO-

Benzodiazepines, OXY-Oxycodone

## 2017-10-22 VITALS — HEART RATE: 61 BPM | SYSTOLIC BLOOD PRESSURE: 121 MMHG | TEMPERATURE: 98.2 F | DIASTOLIC BLOOD PRESSURE: 77 MMHG

## 2017-10-22 LAB
ALBUMIN SERPL-MCNC: 3.4 G/DL (ref 3.4–5)
ALP SERPL-CCNC: 74 U/L (ref 45–117)
ALT SERPL-CCNC: 45 U/L (ref 12–78)
ANION GAP SERPL CALC-SCNC: 9 MMOL/L (ref 8–16)
AST SERPL-CCNC: 21 U/L (ref 15–37)
BILIRUB SERPL-MCNC: 1.2 MG/DL (ref 0.2–1)
CALCIUM SERPL-MCNC: 8.6 MG/DL (ref 8.5–10.1)
CO2 SERPL-SCNC: 26 MMOL/L (ref 21–32)
CREAT SERPL-MCNC: 0.9 MG/DL (ref 0.7–1.3)
DEPRECATED RDW RBC AUTO: 14.2 % (ref 11.9–15.9)
GLUCOSE SERPL-MCNC: 96 MG/DL (ref 74–106)
MCH RBC QN AUTO: 28.6 PG (ref 25.7–33.7)
MCHC RBC AUTO-ENTMCNC: 33.3 G/DL (ref 32–35.9)
MCV RBC: 85.9 FL (ref 80–96)
PLATELET # BLD AUTO: 244 K/MM3 (ref 134–434)
PMV BLD: 7.5 FL (ref 7.5–11.1)
PROT SERPL-MCNC: 6.7 G/DL (ref 6.4–8.2)
SP GR UR: 1.02 (ref 1–1.02)
WBC # BLD AUTO: 5.4 K/MM3 (ref 4–10)

## 2017-10-22 RX ADMIN — NICOTINE SCH MG: 21 PATCH TRANSDERMAL at 10:16

## 2017-10-22 NOTE — EKG
Test Reason : 

Blood Pressure : ***/*** mmHG

Vent. Rate : 066 BPM     Atrial Rate : 066 BPM

   P-R Int : 154 ms          QRS Dur : 096 ms

    QT Int : 408 ms       P-R-T Axes : 049 029 029 degrees

   QTc Int : 427 ms

 

NORMAL SINUS RHYTHM

NORMAL ECG

WHEN COMPARED WITH ECG OF 08-APR-2017 16:22,

NO SIGNIFICANT CHANGE WAS FOUND

Confirmed by JORDYN RIOS MD ([id number]) on 10/22/2017 11:01:40 AM

 

Referred By:             Confirmed By:JORDYN RIOS MD

## 2017-10-22 NOTE — PN
[facility] CIWA





- CIWA Score


Nausea/Vomitin-No Nausea/No Vomiting


Muscle Tremors: 3


Anxiety: 4-Mod. Anxious/Guarded


Agitation: 3


Paroxysmal Sweats: 3


Orientation: 0-Oriented


Tacttile Disturbances: 0-None


Auditory Disturbances: 0-None


Visual Disturbances: 0-None


Headache: 0-None Present


CIWA-Ar Total Score: 13





[facility] COWS





- Scale


Resting Pulse: 0= [place] 80 or Below


Sweatin=Flushed/Facial Moisture


Restless Observation: 1= Difficult to Sit Still


Pupil Size: 0= Normal to Room Light


Bone or Joint Aches: 2= Severe Diffuse Aches


Runny Nose/ Eye Tearin= Runny Nose/Eyes


GI Upset > 30mins: 2= Nausea/Diarrhea


Tremor Observation of Outstretched Hands: 2= Slight Tremor Visible


Yawning Observation: 1= 1-2x During Session


Anxiety or Irritability: 2=Irritable/Anxious


Goose Flesh Skin: 0=Smooth Skin


COWS Score: 14





[facility] Progress Note (SOAP)


Subjective: 


Anxiety,tremors,sweating,interrupted sleep,restless.


Objective: 





10/22/17 14:45


 Vital Signs - 8 hr











  10/22/17





  10:00


 


Temperature 97.2 F L


 


Pulse Rate 65


 


Respiratory 16





Rate 


 


Blood Pressure 122/89








 Laboratory Tests











  10/21/17 10/22/17 10/22/17





  22:45 07:30 07:30


 


WBC   5.4 


 


RBC   4.53 


 


Hgb   13.0 


 


Hct   39.0 


 


MCV   85.9 


 


MCH   28.6 


 


MCHC   33.3 


 


RDW   14.2 


 


Plt Count   244 


 


MPV   7.5 


 


Sodium    139


 


Potassium    3.9


 


Chloride    104


 


Carbon Dioxide    26


 


Anion Gap    9


 


BUN    10  D


 


Creatinine    0.9


 


Creat Clearance w eGFR    > 60


 


Random Glucose    96


 


Calcium    8.6


 


Total Bilirubin    1.2 H D


 


AST    21  D


 


ALT    45  D


 


Alkaline Phosphatase    74


 


Total Protein    6.7


 


Albumin    3.4


 


Urine Color  Dkyellow  


 


Urine Appearance  Slcloudy  


 


Urine pH  6.0  


 


Ur Specific Gravity  1.020  


 


Urine Protein  Negative  


 


Urine Glucose (UA)  Negative  


 


Urine Ketones  Negative  


 


Urine Blood  Negative  


 


Urine Nitrite  Negative  


 


Urine Bilirubin  Negative  


 


Urine Urobilinogen  2.0  


 


Ur Leukocyte Esterase  Negative  


 


RPR Titer   














  10/22/17





  07:30


 


WBC 


 


RBC 


 


Hgb 


 


Hct 


 


MCV 


 


MCH 


 


MCHC 


 


RDW 


 


Plt Count 


 


MPV 


 


Sodium 


 


Potassium 


 


Chloride 


 


Carbon Dioxide 


 


Anion Gap 


 


BUN 


 


Creatinine 


 


Creat Clearance w eGFR 


 


Random Glucose 


 


Calcium 


 


Total Bilirubin 


 


AST 


 


ALT 


 


Alkaline Phosphatase 


 


Total Protein 


 


Albumin 


 


Urine Color 


 


Urine Appearance 


 


Urine pH 


 


Ur Specific Gravity 


 


Urine Protein 


 


Urine Glucose (UA) 


 


Urine Ketones 


 


Urine Blood 


 


Urine Nitrite 


 


Urine Bilirubin 


 


Urine Urobilinogen 


 


Ur Leukocyte Esterase 


 


RPR Titer  Nonreactive








labs noted


Assessment: 





10/22/17 14:45


Withdrawal sx.


Plan: 


Continue detox

## 2017-10-22 NOTE — PN
[facility] Progress Note


Note: 


Patient was approached twice for interview. He was found in bed sleeping both 

times. Finally he told writer that he did not want to talk

## 2018-05-07 ENCOUNTER — HOSPITAL ENCOUNTER (INPATIENT)
Dept: HOSPITAL 74 - YASAS | Age: 43
LOS: 1 days | Discharge: LEFT BEFORE DISCHARGE | DRG: 770 | End: 2018-05-08
Attending: INTERNAL MEDICINE | Admitting: INTERNAL MEDICINE
Payer: COMMERCIAL

## 2018-05-07 VITALS — BODY MASS INDEX: 33.9 KG/M2

## 2018-05-07 DIAGNOSIS — R00.1: ICD-10-CM

## 2018-05-07 DIAGNOSIS — Z59.0: ICD-10-CM

## 2018-05-07 DIAGNOSIS — R76.11: ICD-10-CM

## 2018-05-07 DIAGNOSIS — F17.213: ICD-10-CM

## 2018-05-07 DIAGNOSIS — Z91.013: ICD-10-CM

## 2018-05-07 DIAGNOSIS — F19.24: ICD-10-CM

## 2018-05-07 DIAGNOSIS — F11.23: Primary | ICD-10-CM

## 2018-05-07 LAB
APPEARANCE UR: CLEAR
BILIRUB UR STRIP.AUTO-MCNC: NEGATIVE MG/DL
COLOR UR: YELLOW
KETONES UR QL STRIP: NEGATIVE
LEUKOCYTE ESTERASE UR QL STRIP.AUTO: NEGATIVE
NITRITE UR QL STRIP: NEGATIVE
PH UR: 6 [PH] (ref 5–8)
PROT UR QL STRIP: NEGATIVE
PROT UR QL STRIP: NEGATIVE
SP GR UR: 1.02 (ref 1–1.03)
UROBILINOGEN UR STRIP-MCNC: 2 MG/DL (ref 0.2–1)

## 2018-05-07 PROCEDURE — HZ2ZZZZ DETOXIFICATION SERVICES FOR SUBSTANCE ABUSE TREATMENT: ICD-10-PCS | Performed by: INTERNAL MEDICINE

## 2018-05-07 RX ADMIN — NICOTINE SCH: 14 PATCH, EXTENDED RELEASE TRANSDERMAL at 19:33

## 2018-05-07 SDOH — ECONOMIC STABILITY - HOUSING INSECURITY: HOMELESSNESS: Z59.0

## 2018-05-07 NOTE — HP
COWS





- Scale


Resting Pulse: 0= [place] 80 or Below


Sweatin= Chills/Flushing


Restless Observation: 1= Difficult to Sit Still


Pupil Size: 1= Pupils >than Normal


Bone or Joint Aches: 2= Severe Diffuse Aches


Runny Nose/ Eye Tearin= Nasal Congestion


GI Upset > 30mins: 2= Nausea/Diarrhea


Tremor Observation: 2= Slight Tremor Visible


Yawning Observation: 2= >3x During Session


Anxiety or Irritability: 2=Irritable/Anxious


Goose Flesh Skin: 0=Smooth Skin


COWS Score: 14





Admission ROS S





- HPI


Chief Complaint: 





withdrawal sx opioid


Allergies/Adverse Reactions: 


 Allergies











Allergy/AdvReac Type Severity Reaction Status Date / Time


 


fish derived Allergy Severe Swelling Verified 10/21/17 17:53


 


shellfish derived Allergy Severe Swelling Verified 10/21/17 17:53


 


No Known Drug Allergies Allergy   Verified 10/21/17 17:53


 


Seafood Allergy Severe Swelling Uncoded 10/21/17 17:53











History of Present Illness: 





42 years old male with long history of opioid  nicotine dependence has positive 

ppd and depression is admitted to detox


Exam Limitations: No Limitations





- Ebola screening


Have you traveled outside of the country in the last 21 days: No


Have you had contact with anyone from an Ebola affected area: No


Have you been sick,other than usual withdrawal symptoms: No


Do you have a fever: No





- Review of Systems


Constitutional: Changes in sleep, Weight Stable


EENT: reports: No Symptoms Reported


Respiratory: reports: No Symptoms reported


Cardiac: reports: No Symptoms Reported


GI: reports: Nausea, Poor Fluid Intake, Abdominal cramping


: reports: No Symptoms Reported


Musculoskeletal: reports: Back Pain, Joint Pain, Muscle Pain, Neck Pain


Integumentary: reports: Change in Color (left arm and right hand)


Neuro: reports: Tremors


Endocrine: reports: No Symptoms Reported


Hematology: reports: No Symptoms Reported


Psychiatric: reports: Judgement Intact, Orientated x3, Anxious, Depressed


Other Systems: Reviewed and Negative





Patient History





- Patient Medical History


Hx Anemia: No


Hx Asthma: No


Hx Chronic Obstructive Pulmonary Disease (COPD): No


Hx Cancer: No


Hx Cardiac Disorders: No


Hx Congestive Heart Failure: No


Hx Hypertension: No


Hx Hypercholesterolemia: No


Hx Pacemaker: No


HX Cerebrovascular Accident: No


Hx Seizures: No


Hx Dementia: No


Hx Diabetes: No


Hx Gastrointestinal Disorders: No


Hx Liver Disease: No


Hx Genitourinary Disorders: No


Hx Sexually Transmitted Disorders: No


Hx Renal Disease (ESRD): No


Hx Thyroid Disease: No


Hx Human Immunodeficiency Virus (HIV): No


Hx Hepatitis C: No


Hx Depression: Yes (never hospitalized)


Hx Suicide Attempt: No


Hx Bipolar Disorder: No


Hx Schizophrenia: No





- Patient Surgical History


Past Surgical History: No


Hx Neurologic Surgery: No


Hx Cataract Extraction: No


Hx Cardiac Surgery: No


Hx Lung Surgery: No


Hx Breast Surgery: No


Hx Breast Biopsy: No


Hx Abdominal Surgery: No


Hx Appendectomy: No


Hx Cholecystectomy: No


Hx Genitourinary Surgery: No


Hx Orthopedic Surgery: No





- PPD History


Previous Implant?: Yes


Documented Results: Positive w/proof


Implanted On Prior [facility] Admission?: No


Results: NEEDS CXR 


PPD to be Administered?: No





- Smoking Cessation


Smoking history: Current every day smoker


Have you smoked in the past 12 months: Yes


Aproximately how many cigarettes per day: 10


Cigars Per Day: 0


Hx Chewing Tobacco Use: No


Initiated information on smoking cessation: Yes


'Breaking Loose' booklet given: 18





- Substance & Tx. History


Hx Alcohol Use: No


Hx Substance Use: Yes


Substance Use Type: Marijuana, Opiates


Hx Substance Use Treatment: Yes (10/2017 [facility]





- Substances Abused


  ** Heroin


Route: Injection


Frequency: Daily


Amount used: 25 bags


Age of first use: 27


Date of Last Use: 17





Family Disease History





- Family Disease History


Family Disease History: Other: Father (living), Mother (living), Brother (one - 

living - healthy), Son (healthy)





Admission Physical Exam [facility]





- Vital Signs


Vital Signs: 


 Vital Signs - 24 hr











  18





  13:37


 


Temperature 98.9 F


 


Pulse Rate 68


 


Respiratory 18





Rate 


 


Blood Pressure 156/99














- Physical


General Appearance: Yes: Appropriately Dressed, Mild Distress, Obese, Tremorous

, Irritable, Sweating, Anxious


HEENTM: Yes: Hearing grossly Normal, Normocephalic, Normal Voice


Respiratory: Yes: Chest Non-Tender, Lungs Clear, Normal Breath Sounds, No 

Respiratory Distress, No Accessory Muscle Use


Neck: Yes: Supple, Trachea in good position


Breast: Yes: Breasts Symetrical, No Discharge


Cardiology: Yes: Regular Rhythm, S1, S2, Bradycardia


Abdominal: Yes: Normal Bowel Sounds, Non Tender, Flat


Genitourinary: Yes: Within Normal Limits


Back: Yes: Normal Inspection


Musculoskeletal: Yes: full range of Motion, Gait Steady, Back pain, Muscle Pain


Extremities: Yes: Normal Inspection (left inner elbow iv heroin), Normal Range 

of Motion, Non-Tender, Tremors


Neurological: Yes: Fully Oriented, Alert, Motor Strength 5/5, Normal Response, 

Depressed Affect


Integumentary: Yes: Warm, Track Marks


Lymphatic: Yes: Within Normal Limits





- Diagnostic


(1) Substance induced mood disorder


Current Visit: Yes   Status: Suspected   





(2) Nicotine dependence


Current Visit: Yes   Status: Acute   


Qualifiers: 


   Nicotine product type: cigarettes   Substance use status: in withdrawal   

Qualified Code(s): F17.213 - Nicotine dependence, cigarettes, with withdrawal   





(3) Opioid dependence with withdrawal


Current Visit: Yes   Status: Acute   





(4) PPD positive, treated


Current Visit: Yes   Status: Resolved   





Cleared for Admission [facility]





- Detox or Rehab


[facility] Level of Care: Medically Managed


Detox Regimen/Protocol: Methadone





[facility] Breath Alcohol Content


Breath Alcohol Content: 0





Urine Drug Screen





- Results


Drug Screen Negative: No


Urine Drug Screen Results: THC-Marijuana, OPI-Opiates

## 2018-05-08 VITALS — HEART RATE: 74 BPM

## 2018-05-08 VITALS — SYSTOLIC BLOOD PRESSURE: 116 MMHG | TEMPERATURE: 98 F | DIASTOLIC BLOOD PRESSURE: 71 MMHG

## 2018-05-08 LAB
ALBUMIN SERPL-MCNC: 3.9 G/DL (ref 3.4–5)
ALP SERPL-CCNC: 109 U/L (ref 45–117)
ALT SERPL-CCNC: 49 U/L (ref 12–78)
ANION GAP SERPL CALC-SCNC: 9 MMOL/L (ref 8–16)
AST SERPL-CCNC: 30 U/L (ref 15–37)
BILIRUB SERPL-MCNC: 0.6 MG/DL (ref 0.2–1)
BUN SERPL-MCNC: 10 MG/DL (ref 7–18)
CALCIUM SERPL-MCNC: 8.9 MG/DL (ref 8.5–10.1)
CHLORIDE SERPL-SCNC: 103 MMOL/L (ref 98–107)
CO2 SERPL-SCNC: 28 MMOL/L (ref 21–32)
CREAT SERPL-MCNC: 0.9 MG/DL (ref 0.7–1.3)
DEPRECATED RDW RBC AUTO: 14.9 % (ref 11.9–15.9)
GLUCOSE SERPL-MCNC: 91 MG/DL (ref 74–106)
HCT VFR BLD CALC: 42 % (ref 35.4–49)
HGB BLD-MCNC: 14.5 GM/DL (ref 11.7–16.9)
MCH RBC QN AUTO: 30.5 PG (ref 25.7–33.7)
MCHC RBC AUTO-ENTMCNC: 34.6 G/DL (ref 32–35.9)
MCV RBC: 88.1 FL (ref 80–96)
PLATELET # BLD AUTO: 247 K/MM3 (ref 134–434)
PMV BLD: 8.2 FL (ref 7.5–11.1)
POTASSIUM SERPLBLD-SCNC: 3.9 MMOL/L (ref 3.5–5.1)
PROT SERPL-MCNC: 8.1 G/DL (ref 6.4–8.2)
RBC # BLD AUTO: 4.76 M/MM3 (ref 4–5.6)
SODIUM SERPL-SCNC: 140 MMOL/L (ref 136–145)
WBC # BLD AUTO: 4.9 K/MM3 (ref 4–10)

## 2018-05-08 RX ADMIN — NICOTINE SCH: 14 PATCH, EXTENDED RELEASE TRANSDERMAL at 10:08

## 2018-05-08 NOTE — CONSULT
[facility] Psychiatric Consult





- Data


Date of interview: 05/08/18


Admission source: [facility] 


Identifying data: Patient REFUSED psychiatric interview.Nursing staff is made 

aware.

## 2018-05-08 NOTE — EKG
Test Reason : 

Blood Pressure : ***/*** mmHG

Vent. Rate : 066 BPM     Atrial Rate : 066 BPM

   P-R Int : 166 ms          QRS Dur : 094 ms

    QT Int : 394 ms       P-R-T Axes : 035 022 025 degrees

   QTc Int : 413 ms

 

NORMAL SINUS RHYTHM

NORMAL ECG

WHEN COMPARED WITH ECG OF 21-OCT-2017 17:51,

NO SIGNIFICANT CHANGE WAS FOUND

Confirmed by MD Salinas Edward ([id number]) on 5/8/2018 9:52:00 AM

 

Referred By:             Confirmed By:Joni Salinas MD

## 2018-05-08 NOTE — DS
[facility] Detox Discharge Summary


Admission Date: 


05/07/18








- History


Present History: Opioid Dependence


Additional Comments: 





Patient in no apparent distress. Denies suicidal / homicidal ideation . Patient 

insist on leaving today, although it advise for him to stay and continue to 

treatment. 


The patient verbalizes he understands the risks and complications that may 

result from the refusal of medical care which may include death and permanent 

disability and has the mental capacity to make such decision. Patient advised 

to seek medical care at local ER , PCP or urgent care if symptoms worsen. 


Pertinent Past History: 





 Vital Signs











Temperature  98.0 F   05/08/18 17:27


 


Pulse Rate  74   05/08/18 17:27


 


Respiratory Rate  18   05/08/18 17:27


 


Blood Pressure  116/71   05/08/18 17:27


 


O2 Sat by Pulse Oximetry (%)      








 Laboratory Last Values











WBC  4.9 K/mm3 (4.0-10.0)   05/08/18  06:00    


 


RBC  4.76 M/mm3 (4.00-5.60)   05/08/18  06:00    


 


Hgb  14.5 GM/dL (11.7-16.9)  D 05/08/18  06:00    


 


Hct  42.0 % (35.4-49)   05/08/18  06:00    


 


MCV  88.1 fl (80-96)   05/08/18  06:00    


 


MCH  30.5 pg (25.7-33.7)   05/08/18  06:00    


 


MCHC  34.6 g/dl (32.0-35.9)   05/08/18  06:00    


 


RDW  14.9 % (11.9-15.9)   05/08/18  06:00    


 


Plt Count  247 K/MM3 (134-434)   05/08/18  06:00    


 


MPV  8.2 fl (7.5-11.1)   05/08/18  06:00    


 


Sodium  140 mmol/L (136-145)   05/08/18  06:00    


 


Potassium  3.9 mmol/L (3.5-5.1)   05/08/18  06:00    


 


Chloride  103 mmol/L ()   05/08/18  06:00    


 


Carbon Dioxide  28 mmol/L (21-32)   05/08/18  06:00    


 


Anion Gap  9  (8-16)   05/08/18  06:00    


 


BUN  10 mg/dL (7-18)   05/08/18  06:00    


 


Creatinine  0.9 mg/dL (0.7-1.3)   05/08/18  06:00    


 


Creat Clearance w eGFR  > 60  (>60)   05/08/18  06:00    


 


Random Glucose  91 mg/dL ()   05/08/18  06:00    


 


Calcium  8.9 mg/dL (8.5-10.1)   05/08/18  06:00    


 


Total Bilirubin  0.6 mg/dL (0.2-1.0)  D 05/08/18  06:00    


 


AST  30 U/L (15-37)  D 05/08/18  06:00    


 


ALT  49 U/L (12-78)   05/08/18  06:00    


 


Alkaline Phosphatase  109 U/L ()  D 05/08/18  06:00    


 


Total Protein  8.1 g/dl (6.4-8.2)  D 05/08/18  06:00    


 


Albumin  3.9 g/dl (3.4-5.0)   05/08/18  06:00    


 


Urine Color  Yellow   05/07/18  18:17    


 


Urine Appearance  Clear   05/07/18  18:17    


 


Urine pH  6.0  (5.0-8.0)   05/07/18  18:17    


 


Ur Specific Gravity  1.025  (1.001-1.035)   05/07/18  18:17    


 


Urine Protein  Negative  (NEGATIVE)   05/07/18  18:17    


 


Urine Glucose (UA)  Negative  (NEGATIVE)   05/07/18  18:17    


 


Urine Ketones  Negative  (NEGATIVE)   05/07/18  18:17    


 


Urine Blood  Negative  (NEGATIVE)   05/07/18  18:17    


 


Urine Nitrite  Negative  (NEGATIVE)   05/07/18  18:17    


 


Urine Bilirubin  Negative  (<2.0 mg/dL)   05/07/18  18:17    


 


Urine Urobilinogen  2.0 mg/dL (0.2-1.0)   05/07/18  18:17    


 


Ur Leukocyte Esterase  Negative  (NEGATIVE)   05/07/18  18:17    


 


RPR Titer  Nonreactive  (NONREACTIVE)   05/08/18  06:00    














- Physical Exam Results


Vital Signs: 


 Vital Signs











Temperature  98.0 F   05/08/18 17:27


 


Pulse Rate  74   05/08/18 17:27


 


Respiratory Rate  18   05/08/18 17:27


 


Blood Pressure  116/71   05/08/18 17:27


 


O2 Sat by Pulse Oximetry (%)      














- Medication


Discharge Medications: 


Ambulatory Orders





NK [No Known Home Medication]  02/18/17 











- Diagnosis


(1) Nicotine dependence


Current Visit: Yes   Status: Acute   


Qualifiers: 


   Nicotine product type: cigarettes   Substance use status: in withdrawal   

Qualified Code(s): F17.213 - Nicotine dependence, cigarettes, with withdrawal   





(2) Opioid dependence with withdrawal


Current Visit: Yes   Status: Acute   





(3) PPD positive, treated


Current Visit: Yes   Status: Resolved   





- AMA


Did Patient Leave Against Medical Advice: Yes

## 2018-05-08 NOTE — PN
[facility] Progress Note


Note: 





Patient insist on leaving today. Patient reports he's not getting enough 

methadone to manage his opioid use, reports currently injects 6 bags of heroin 

four times a day. Patient highly encourage to stay and advise withdrawal 

symptoms will be manage. Patient continue to insist on leaving. Patient educate 

on the risk of not completing treatment. Advise if symptoms worsen to seek 

medical attention. Patient verbalize understanding.

## 2018-05-08 NOTE — PN
[facility] COWS





- Scale


Resting Pulse: 0= OK 80 or Below


Sweatin= No chills or Flushing


Restless Observation: 1= Difficult to Sit Still


Pupil Size: 0= Normal to Room Light


Bone or Joint Aches: 2= Severe Diffuse Aches


Runny Nose/ Eye Tearin= Runny Nose/Eyes


GI Upset > 30mins: 1= Stomach Cramp


Tremor Observation of Outstretched Hands: 0= None


Yawning Observation: 1= 1-2x During Session


Anxiety or Irritability: 2=Irritable/Anxious


Goose Flesh Skin: 3=Piloerection


COWS Score: 12





[facility] Progress Note (SOAP)


Subjective: 





Anxious, Stomach Cramping, Fatigue, Body Aches.


Objective: 


PATIENT A & O X 3. NO ACUTE DISTRESS.





18 11:51


 Vital Signs











Temperature  97.6 F   18 10:04


 


Pulse Rate  65   18 10:04


 


Respiratory Rate  18   18 10:04


 


Blood Pressure  120/72   18 10:04


 


O2 Sat by Pulse Oximetry (%)      








 Laboratory Tests











  18





  18:17 06:00 06:00


 


WBC   4.9 


 


RBC   4.76 


 


Hgb   14.5  D 


 


Hct   42.0 


 


MCV   88.1 


 


MCH   30.5 


 


MCHC   34.6 


 


RDW   14.9 


 


Plt Count   247 


 


MPV   8.2 


 


Sodium    140


 


Potassium    3.9


 


Chloride    103


 


Carbon Dioxide    28


 


Anion Gap    9


 


BUN    10


 


Creatinine    0.9


 


Creat Clearance w eGFR    > 60


 


Random Glucose    91


 


Calcium    8.9


 


Total Bilirubin    0.6  D


 


AST    30  D


 


ALT    49


 


Alkaline Phosphatase    109  D


 


Total Protein    8.1  D


 


Albumin    3.9


 


Urine Color  Yellow  


 


Urine Appearance  Clear  


 


Urine pH  6.0  


 


Ur Specific Gravity  1.025  


 


Urine Protein  Negative  


 


Urine Glucose (UA)  Negative  


 


Urine Ketones  Negative  


 


Urine Blood  Negative  


 


Urine Nitrite  Negative  


 


Urine Bilirubin  Negative  


 


Urine Urobilinogen  2.0  


 


Ur Leukocyte Esterase  Negative  








LABS NOTED.


RPR RESULT PENDING.


18 11:52





Assessment: 





18 11:51


WITHDRAWAL SYMPTOMS.


Plan: 





CONTINUE DETOX.

## 2020-05-18 NOTE — PN
[facility] CIWA





- CIWA Score


Nausea/Vomitin-No Nausea/No Vomiting


Muscle Tremors: 3


Anxiety: 4-Mod. Anxious/Guarded


Agitation: 3


Paroxysmal Sweats: 3


Orientation: 0-Oriented


Tacttile Disturbances: 0-None


Auditory Disturbances: 0-None


Visual Disturbances: 0-None


Headache: 0-None Present


CIWA-Ar Total Score: 13





[facility] COWS





- Scale


Resting Pulse: 0= SC 80 or Below


Sweatin=Flushed/Facial Moisture


Restless Observation: 1= Difficult to Sit Still


Pupil Size: 0= Normal to Room Light


Bone or Joint Aches: 2= Severe Diffuse Aches


Runny Nose/ Eye Tearin= Runny Nose/Eyes


GI Upset > 30mins: 2= Nausea/Diarrhea


Tremor Observation of Outstretched Hands: 2= Slight Tremor Visible


Yawning Observation: 1= 1-2x During Session


Anxiety or Irritability: 2=Irritable/Anxious


Goose Flesh Skin: 0=Smooth Skin


COWS Score: 14





[facility] Progress Note (SOAP)


Subjective: 


anxiety,sweating,interrupted sleep,restless,nausea,muscle aches/spasm


Objective: 





17 13:16


 Vital Signs - 8 hr











  17





  06:27 06:30 11:51


 


Temperature 96.9 F L  96.8 F L


 


Pulse Rate 71  73


 


Respiratory 18 18 18





Rate   


 


Blood Pressure 120/73  147/92








 Laboratory Tests











  17





  07:45 07:45 07:45


 


WBC  5.0  


 


RBC  4.29  


 


Hgb  12.6  


 


Hct  37.3  


 


MCV  87.0  


 


MCHC  33.9  


 


RDW  13.2  


 


Plt Count  244  


 


MPV  7.8  


 


Sodium   142 


 


Potassium   3.4 L 


 


Chloride   104 


 


Carbon Dioxide   27 


 


Anion Gap   11 


 


BUN   12 


 


Creatinine   1.0 


 


Creat Clearance w eGFR   > 60 


 


Random Glucose   127 H 


 


Calcium   8.4 L 


 


Total Bilirubin   0.4 


 


AST   11 L 


 


ALT   18 


 


Alkaline Phosphatase   82 


 


Total Protein   6.1 L 


 


Albumin   3.4 


 


RPR Titer    Nonreactive








labs noted,k+ 3.4


Assessment: 





17 13:16


withdrawal sx.


Hypokalemia


Plan: 


continue detox


k-dur Carac Pregnancy And Lactation Text: This medication is Pregnancy Category X and contraindicated in pregnancy and in women who may become pregnant. It is unknown if this medication is excreted in breast milk.